# Patient Record
Sex: MALE | Race: WHITE | NOT HISPANIC OR LATINO
[De-identification: names, ages, dates, MRNs, and addresses within clinical notes are randomized per-mention and may not be internally consistent; named-entity substitution may affect disease eponyms.]

---

## 2021-11-18 ENCOUNTER — TRANSCRIPTION ENCOUNTER (OUTPATIENT)
Age: 56
End: 2021-11-18

## 2021-11-18 ENCOUNTER — EMERGENCY (EMERGENCY)
Facility: HOSPITAL | Age: 56
LOS: 1 days | End: 2021-11-18
Admitting: EMERGENCY MEDICINE
Payer: OTHER GOVERNMENT

## 2021-11-18 PROCEDURE — 99284 EMERGENCY DEPT VISIT MOD MDM: CPT

## 2021-11-19 ENCOUNTER — APPOINTMENT (OUTPATIENT)
Dept: DISASTER EMERGENCY | Facility: HOSPITAL | Age: 56
End: 2021-11-19

## 2021-11-19 ENCOUNTER — OUTPATIENT (OUTPATIENT)
Dept: OUTPATIENT SERVICES | Facility: HOSPITAL | Age: 56
LOS: 1 days | End: 2021-11-19
Payer: MEDICARE

## 2021-11-19 VITALS
DIASTOLIC BLOOD PRESSURE: 66 MMHG | SYSTOLIC BLOOD PRESSURE: 108 MMHG | HEART RATE: 70 BPM | OXYGEN SATURATION: 98 % | TEMPERATURE: 100 F | RESPIRATION RATE: 17 BRPM

## 2021-11-19 VITALS
HEART RATE: 72 BPM | OXYGEN SATURATION: 97 % | RESPIRATION RATE: 18 BRPM | TEMPERATURE: 99 F | SYSTOLIC BLOOD PRESSURE: 126 MMHG | DIASTOLIC BLOOD PRESSURE: 81 MMHG

## 2021-11-19 DIAGNOSIS — U07.1 COVID-19: ICD-10-CM

## 2021-11-19 PROCEDURE — M0243: CPT

## 2021-11-19 RX ORDER — SODIUM CHLORIDE 9 MG/ML
250 INJECTION INTRAMUSCULAR; INTRAVENOUS; SUBCUTANEOUS
Refills: 0 | Status: COMPLETED | OUTPATIENT
Start: 2021-11-19 | End: 2021-11-19

## 2021-11-19 RX ADMIN — SODIUM CHLORIDE 310 MILLILITER(S): 9 INJECTION INTRAMUSCULAR; INTRAVENOUS; SUBCUTANEOUS at 09:09

## 2021-11-19 NOTE — CHART NOTE - NSCHARTNOTEFT_GEN_A_CORE
CC: Monoclonal Antibody Infusion/COVID 19 Positive on 11/18/21      History: Patient presents for infusion of monoclonal antibody infusion. Patient has been screened and was deemed to be a candidate.    Symptoms/ Criteria: Fever, malaise, body aches, HA, loss of taste/ smell, GI Symptoms    Risk Profile includes: Not vaccinated,  BMI >25,     casirivimab/imdevimab in sodium chloride 0.9% (EUA) IVPB 100 milliLiter(s) IV Intermittent once  sodium chloride 0.9%. 250 milliLiter(s) IV Continuous <Continuous>      PMHx:  Infection due to severe acute respiratory syndrome coronavirus 2 (SARS-CoV-2)          T(C): 37.1 (11-19-21 @ 08:14), Max: 37.1 (11-19-21 @ 08:14)  HR: 72 (11-19-21 @ 08:14) (72 - 72)  BP: 126/81 (11-19-21 @ 08:14) (126/81 - 126/81)  RR: 18 (11-19-21 @ 08:14) (18 - 18)  SpO2: 97% (11-19-21 @ 08:14) (97% - 97%)      PE:   Appearance: NAD	  HEENT:   Normal oral mucosa.   Lymphatic: No lymphadenopathy  Cardiovascular: Normal S1 S2, No JVD, No murmurs, No edema  Respiratory: Lungs clear to auscultation	  Gastrointestinal:  Soft, Non-tender. No guarding   Skin: warm and dry  Neurologic: Non-focal  Extremities: Normal range of motion.     ASSESSMENT:  Pt is a 56y year old Male Covid +  referred to the infusion center for Monoclonal antibody infusion (Regeneron).  COVID Vaccination Status: Not vaccinated.      PLAN:  - infusion procedure explained to patient   - Consent for monoclonal antibody infusion obtained   - Risk & benefits discussed/all questions answered  - infuse Regeneron over 21 minutes  - will observe patient for one hour post infusion  and then if stable discharge home with outpt follow up as planned by PMD.    POST INFUSION ASSESSMENT:   DISCHARGE at approximately  1  hour post infusion    - Patient tolerated infusion well denies complaints of chest pain/SOB/dizziness/ palps  - VSS for discharge home  - D/C instructions given/ fact sheet included.  - Patient to follow-up with PCP as needed.